# Patient Record
Sex: FEMALE | Race: BLACK OR AFRICAN AMERICAN | NOT HISPANIC OR LATINO | URBAN - METROPOLITAN AREA
[De-identification: names, ages, dates, MRNs, and addresses within clinical notes are randomized per-mention and may not be internally consistent; named-entity substitution may affect disease eponyms.]

---

## 2019-06-18 ENCOUNTER — EMERGENCY (EMERGENCY)
Facility: HOSPITAL | Age: 19
LOS: 1 days | Discharge: HOME | End: 2019-06-18
Attending: EMERGENCY MEDICINE
Payer: COMMERCIAL

## 2019-06-18 VITALS
HEART RATE: 89 BPM | DIASTOLIC BLOOD PRESSURE: 76 MMHG | RESPIRATION RATE: 16 BRPM | HEIGHT: 65 IN | SYSTOLIC BLOOD PRESSURE: 131 MMHG | WEIGHT: 201.06 LBS | OXYGEN SATURATION: 98 % | TEMPERATURE: 99 F

## 2019-06-18 PROCEDURE — 73610 X-RAY EXAM OF ANKLE: CPT | Mod: 26,LT

## 2019-06-18 PROCEDURE — 73630 X-RAY EXAM OF FOOT: CPT

## 2019-06-18 PROCEDURE — 73610 X-RAY EXAM OF ANKLE: CPT

## 2019-06-18 PROCEDURE — 73630 X-RAY EXAM OF FOOT: CPT | Mod: 26,LT

## 2019-06-18 PROCEDURE — 99284 EMERGENCY DEPT VISIT MOD MDM: CPT

## 2019-06-18 PROCEDURE — 73562 X-RAY EXAM OF KNEE 3: CPT | Mod: 26,LT

## 2019-06-18 PROCEDURE — 73562 X-RAY EXAM OF KNEE 3: CPT

## 2019-06-18 PROCEDURE — 99283 EMERGENCY DEPT VISIT LOW MDM: CPT

## 2019-06-18 NOTE — ED PROVIDER NOTE - CLINICAL SUMMARY MEDICAL DECISION MAKING FREE TEXT BOX
rotational injuries of the left knee and ankle from two days ago, low suspicion for fracture given ability to bear weight but does have bony tenderness on the lateral mall. Will get plain films to r/o fracture. Pain controlled by NSAIDs prior to arrival. Dr. Giraldo (Attending Physician)  rotational injuries of the left knee and ankle from two days ago, low suspicion for fracture given ability to bear weight but does have bony tenderness on the lateral mall. Will get plain films to r/o fracture. Pain controlled by NSAIDs prior to arrival.

## 2019-06-18 NOTE — ED ADULT NURSE NOTE - NSIMPLEMENTINTERV_GEN_ALL_ED
Implemented All Universal Safety Interventions:  [place] to call system. Call bell, personal items and telephone within reach. Instruct patient to call for assistance. Room bathroom lighting operational. Non-slip footwear when patient is off stretcher. Physically safe environment: no spills, clutter or unnecessary equipment. Stretcher in lowest position, wheels locked, appropriate side rails in place.

## 2019-06-18 NOTE — ED PROVIDER NOTE - OBJECTIVE STATEMENT
18yof w/ no PMH slipped going down stairs 2 or 3 days ago, rolling the left ankle and knee. [place] cracks and felt pops. Has been able to bear weight since but complains of instability in the left knee and lateral ankle pain. Taking NSAIDs with improvement of pain. Denies any head strike or other injuries.

## 2019-06-18 NOTE — ED ADULT NURSE NOTE - OBJECTIVE STATEMENT
18 y f came to the ed c/o left ankle pain and left knee pain. states the pain started 2-3 days ago after tripping. says she rolled her ankle and knee when she fell. states being ambulatory since falling. denies any other injuries. denies pain meds saying she just took advil prior to coming.

## 2019-06-18 NOTE — ED PROVIDER NOTE - LOWER EXTREMITY EXAM, LEFT
left ankle w/mild lateral swelling, TTP over ATFL and lateral mall, mortise intact, no pain with proximal squeeze; left knee w/ suprapatellar effusion, no palpable deformities or joint line tenderness, no gross ligamentous instability

## 2019-06-18 NOTE — ED PROVIDER NOTE - NSFOLLOWUPCLINICS_GEN_ALL_ED_FT
[facility] Orthopedic Surgery  Orthopedic Surgery  [address], 3rd & 4th [place] [address]  Phone: [phone]  Fax:   Follow Up Time:

## 2019-06-18 NOTE — ED PROVIDER NOTE - NSFOLLOWUPINSTRUCTIONS_ED_ALL_ED_FT
Wear air cast for comfort.  Take acetaminophen 500 mg (1 pill extra strength tylenol) every 6 hours as needed for pain. Take ibuprofen 600 mg (3 tabs) every 8 hours with food as needed for pain. Wear air cast for comfort. Follow-up with ortho if pain not improving in one week.  Take acetaminophen 500 mg (1 pill extra strength tylenol) every 6 hours as needed for pain. Take ibuprofen 600 mg (3 tabs) every 8 hours with food as needed for pain.

## 2019-06-18 NOTE — ED PROVIDER NOTE - ATTENDING CONTRIBUTION TO CARE
Dr. Giraldo (Attending Physician)  I performed a history and physical exam of the patient and discussed their management with the resident. I reviewed the resident's note and agree with the documented findings and plan of care. My medical decision making and observations are found above.

## 2021-12-09 ENCOUNTER — EMERGENCY (EMERGENCY)
Facility: HOSPITAL | Age: 21
LOS: 1 days | Discharge: HOME | End: 2021-12-09
Attending: EMERGENCY MEDICINE
Payer: COMMERCIAL

## 2021-12-09 VITALS
WEIGHT: 195.11 LBS | SYSTOLIC BLOOD PRESSURE: 124 MMHG | HEIGHT: 65 IN | TEMPERATURE: 98 F | DIASTOLIC BLOOD PRESSURE: 75 MMHG | HEART RATE: 75 BPM | RESPIRATION RATE: 18 BRPM | OXYGEN SATURATION: 99 %

## 2021-12-09 VITALS
DIASTOLIC BLOOD PRESSURE: 74 MMHG | OXYGEN SATURATION: 98 % | RESPIRATION RATE: 18 BRPM | TEMPERATURE: 98 F | SYSTOLIC BLOOD PRESSURE: 120 MMHG | HEART RATE: 71 BPM

## 2021-12-09 LAB
RAPID RVP RESULT: SIGNIFICANT CHANGE UP
SARS-COV-2 RNA SPEC QL NAA+PROBE: SIGNIFICANT CHANGE UP

## 2021-12-09 PROCEDURE — 99283 EMERGENCY DEPT VISIT LOW MDM: CPT

## 2021-12-09 PROCEDURE — 99284 EMERGENCY DEPT VISIT MOD MDM: CPT

## 2021-12-09 PROCEDURE — 0225U NFCT DS DNA&RNA 21 SARSCOV2: CPT

## 2021-12-09 NOTE — ED ADULT NURSE NOTE - OBJECTIVE STATEMENT
21 y/o female no significant PMHx arrives to [facility] ED by car from home with mother with c/o headache. Pt states sudden onset green, loose stools x 4 days, patient endorses 2 BM's/day and normal BM's 3-4 per day. Patient went to PMD yesterday, prescribed azithromycin, did not start abx. Patient arrives to ED for continued green stools, headache and congestion. Patient is A&Ox4. Respirations spontaneous and unlabored. Denies SOB, dyspnea, cough, chest pain, palpitations. No abdominal pain, soft NT/ND. No n/v/d. LMP last month. Denies urinary symptoms. Denies fever/chills. No sick contacts. Skin is warm/dry and normal for race. Ambulates independently at baseline.

## 2021-12-09 NOTE — ED PROVIDER NOTE - CLINICAL SUMMARY MEDICAL DECISION MAKING FREE TEXT BOX
19yo female with no pmh presenting with headache, congestion, sore throat, diarrhea.  Likely viral syndrome. No risk factors bacterial/ parasitic infection.  Well appearing here and well hydrated.  Will rvp and dc.  Offered meds and explained return precautions to mom at bedside and patient.

## 2021-12-09 NOTE — ED PROVIDER NOTE - NSFOLLOWUPINSTRUCTIONS_ED_ALL_ED_FT
Rest, drink plenty of fluids  Advance activity as tolerated  Continue all previously prescribed medications as directed  Follow up with your PMD - bring copies of your results  Return to the ER for abdominal pain, excessive vomiting, inability to tolerate food or fluids, or other new or concerning symptoms

## 2021-12-09 NOTE — ED PROVIDER NOTE - ATTENDING CONTRIBUTION TO CARE
MD Monroe:  patient seen and evaluated personally.   I agree with the History & Physical,  Impression & Plan other than what was detailed in my note.  MD Monroe  21yo female with no pmh presenting with cc of diarrhea x 4 days that has been improving, here bc it has been greenish, no blood, not foul smelling, started on azithromycin for congestions, sore throat symptoms but no other recent abx, vitals stable  non toxic well appearing, NC/AT,  conjunctiva non conjected, sclera anicteric, moist mucous membranes, neck supple, heart sounds, normal, no mrg, lungs cta b/l no wrr, abd soft non distended w/ no tenderness, no visual deformities of extremities, axox3, , normal mood and affect. Pt appears well hydrated, no abd ttp, likely consistent w/ viral illness,  plan to po challenge, likely dc home

## 2021-12-09 NOTE — ED PROVIDER NOTE - OBJECTIVE STATEMENT
19yo female with no pmh presenting with headache, congestion, sore throat, diarrhea.  Diarrhea started 4 days ago and has been iomproving.  She was concerned because stool has been greenish.  Had 3-4 loose bowel movements past 3 days and the 2 more formed today.  No fever.  No sick contacts.  Went to pmd for this and given azithro and sent for stool studies but has not been able to give sample yet.  No other antibiotic use or travel.  No sick contacts.  Tolerating po, no vomiting.

## 2021-12-09 NOTE — ED PROVIDER NOTE - PATIENT PORTAL LINK FT
You can access the FollowMyHealth Patient Portal offered by [facility] by registering at the following website: [email or web address]. By joining [employer]’s FollowMyHealth portal, you will also be able to view your health information using other applications (apps) compatible with our system.

## 2021-12-09 NOTE — ED PROVIDER NOTE - PHYSICAL EXAMINATION
General appearance: NAD, conversant, afebrile    Eyes: anicteric sclerae, DIONICIO, EOMI   HENT: Atraumatic; oropharynx clear, MMM and no ulcerations, no pharyngeal erythema or exudate   Neck: Trachea midline; Full range of motion, supple   Pulm: CTA bl, normal respiratory effort and no intercostal retractions, normal work of breathing   CV: RRR, No murmurs, rubs, or gallops   Abdomen: Soft, non-tender, non-distended; no guarding or rebound   Extremities: No peripheral edema    Skin: Dry, normal temperature, turgor and texture; no rash   Psych: Appropriate affect, cooperative; alert and oriented to person, place and time

## 2023-07-07 ENCOUNTER — EMERGENCY (EMERGENCY)
Facility: HOSPITAL | Age: 23
LOS: 1 days | Discharge: HOME | End: 2023-07-07
Attending: EMERGENCY MEDICINE
Payer: COMMERCIAL

## 2023-07-07 VITALS
HEART RATE: 92 BPM | SYSTOLIC BLOOD PRESSURE: 123 MMHG | TEMPERATURE: 98 F | HEIGHT: 65 IN | RESPIRATION RATE: 16 BRPM | DIASTOLIC BLOOD PRESSURE: 84 MMHG | WEIGHT: 169.98 LBS | OXYGEN SATURATION: 97 %

## 2023-07-07 PROCEDURE — 71046 X-RAY EXAM CHEST 2 VIEWS: CPT | Mod: 26

## 2023-07-07 PROCEDURE — 99283 EMERGENCY DEPT VISIT LOW MDM: CPT | Mod: 25

## 2023-07-07 PROCEDURE — 71046 X-RAY EXAM CHEST 2 VIEWS: CPT

## 2023-07-07 PROCEDURE — 99284 EMERGENCY DEPT VISIT MOD MDM: CPT

## 2023-07-07 NOTE — ED ADULT NURSE NOTE - NSFALLUNIVINTERV_ED_ALL_ED
Bed/Stretcher in lowest position, wheels locked, appropriate side rails in place/Call bell, personal items and telephone in reach/Instruct patient to call for assistance before getting out of bed/chair/stretcher/Non-slip footwear applied when patient is off stretcher/[place] to call system/Physically safe environment - no spills, clutter or unnecessary equipment/Purposeful proactive rounding/Room/bathroom lighting operational, light cord in reach
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
## 2023-07-07 NOTE — ED PROVIDER NOTE - PHYSICAL EXAMINATION
GENERAL: AAOx4, GCS 15, NAD, WDWN   HEENT: MMM, no jugular venous distension, supple neck, PERRLA, EOMI, nonicteric sclera  PULM: CTA B, no crackles/rubs/rales  CV: RRR, S1S2, no MRG.  mild chest wall tenderness to palpation L lateral to sternum without stepoff.  No chest wall or abd wall bruising.    ABD: Flat abdomen, NTND, no R/G/R, no CVAT.    MSK: GONZALEZ, +2 pulses x4  NEURO: No obvious focal deficits  PSYCH: AAOx3, clear thought and normal sensorium
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
